# Patient Record
Sex: FEMALE | Race: WHITE | NOT HISPANIC OR LATINO | Employment: FULL TIME | URBAN - METROPOLITAN AREA
[De-identification: names, ages, dates, MRNs, and addresses within clinical notes are randomized per-mention and may not be internally consistent; named-entity substitution may affect disease eponyms.]

---

## 2017-05-01 PROCEDURE — 87086 URINE CULTURE/COLONY COUNT: CPT | Performed by: EMERGENCY MEDICINE

## 2017-05-04 ENCOUNTER — TELEPHONE (OUTPATIENT)
Dept: URGENT CARE | Facility: CLINIC | Age: 27
End: 2017-05-04

## 2017-10-27 ENCOUNTER — HOSPITAL ENCOUNTER (EMERGENCY)
Facility: HOSPITAL | Age: 27
Discharge: HOME | End: 2017-10-27
Attending: EMERGENCY MEDICINE | Admitting: EMERGENCY MEDICINE

## 2017-10-27 VITALS
WEIGHT: 293 LBS | SYSTOLIC BLOOD PRESSURE: 141 MMHG | TEMPERATURE: 97.8 F | DIASTOLIC BLOOD PRESSURE: 92 MMHG | RESPIRATION RATE: 16 BRPM | OXYGEN SATURATION: 97 % | HEART RATE: 118 BPM | BODY MASS INDEX: 41.95 KG/M2 | HEIGHT: 70 IN

## 2017-10-27 DIAGNOSIS — F41.9 ANXIETY: ICD-10-CM

## 2017-10-27 DIAGNOSIS — R00.0 TACHYCARDIA: Primary | ICD-10-CM

## 2017-10-27 PROCEDURE — 99283 EMERGENCY DEPT VISIT LOW MDM: CPT

## 2017-10-27 PROCEDURE — 93005 ELECTROCARDIOGRAM TRACING: CPT | Performed by: EMERGENCY MEDICINE

## 2017-10-27 RX ORDER — LISINOPRIL 20 MG/1
20 TABLET ORAL DAILY
COMMUNITY
End: 2022-02-23

## 2017-10-27 RX ORDER — FLUOXETINE HYDROCHLORIDE 20 MG/1
20 CAPSULE ORAL DAILY
COMMUNITY
End: 2022-02-23

## 2017-10-27 RX ORDER — METOPROLOL TARTRATE 5 MG/5ML
5 INJECTION INTRAVENOUS
Status: DISCONTINUED | OUTPATIENT
Start: 2017-10-27 | End: 2017-10-27

## 2017-10-27 RX ORDER — METHYLPHENIDATE HYDROCHLORIDE 5 MG/1
5 TABLET ORAL 2 TIMES DAILY
COMMUNITY
End: 2022-02-23

## 2017-10-27 RX ADMIN — METOPROLOL TARTRATE 25 MG: 25 TABLET ORAL at 22:09

## 2017-10-28 NOTE — ED PROVIDER NOTES
Subjective   HPI Comments: Miriam Constantino is a 27 y.o.female who presents to the ED with c/o palpitations with onset two hours ago. She reports that she was performing her usual activities when suddenly she developed rapid palpitations. She states that she also developed mild lightheadedness and chest tightness 2/2 her palpitations. She notes that she has started Prozac, a new medication for her, 2 days ago 2/2 to her hx of anxiety. She denies any diaphoresis, SOA, fever, abd pain, or any other complaints at this time. She notes that she has hx of HTN.     Patient is a 27 y.o. female presenting with palpitations.   History provided by:  Patient  Palpitations   Palpitations quality:  Fast  Onset quality:  Sudden  Timing:  Constant  Progression:  Worsening  Chronicity:  Recurrent  Relieved by:  Nothing  Worsened by:  Nothing  Ineffective treatments:  None tried  Associated symptoms: no diaphoresis and no shortness of breath        Review of Systems   Constitutional: Negative for diaphoresis and fever.   Respiratory: Positive for chest tightness. Negative for shortness of breath.    Cardiovascular: Positive for palpitations.   Gastrointestinal: Negative for abdominal pain.   Neurological: Positive for light-headedness.   All other systems reviewed and are negative.      History reviewed. No pertinent past medical history.    Allergies   Allergen Reactions   • Levaquin [Levofloxacin] Hives   • Penicillins Hives       Past Surgical History:   Procedure Laterality Date   • DENTAL PROCEDURE     • FOOT SURGERY         History reviewed. No pertinent family history.    Social History     Social History   • Marital status: Single     Spouse name: N/A   • Number of children: N/A   • Years of education: N/A     Social History Main Topics   • Smoking status: Current Every Day Smoker     Packs/day: 0.50     Types: Cigarettes   • Smokeless tobacco: None   • Alcohol use Yes      Comment: occasional   • Drug use: No   • Sexual  activity: Not Asked     Other Topics Concern   • None     Social History Narrative         Objective   Physical Exam   Constitutional: She is oriented to person, place, and time. She appears well-developed and well-nourished. No distress.   HENT:   Head: Normocephalic and atraumatic.   Nose: Nose normal.   Airway patent.   Eyes: Conjunctivae are normal. No scleral icterus.   Neck: Normal range of motion and phonation normal. Neck supple.   Cardiovascular: Regular rhythm.  Tachycardia present.    No murmur heard.  Pulmonary/Chest: Effort normal. No respiratory distress.   Abdominal: Soft. There is no tenderness.   Musculoskeletal:   No hyperreflexia. No clonus.    Neurological: She is alert and oriented to person, place, and time.   Skin: Skin is warm and dry.   Psychiatric: She has a normal mood and affect. Her behavior is normal.   Nursing note and vitals reviewed.      Procedures         ED Course  ED Course   Comment By Time   She does not wish any workup at this time.  She had workup with thyroid testing about six months ago.  She is relieved that she doesn't have an arrhythmia.  She truly believes her problem is an anxiety attack.  This is most likely.  However, since the tachycardia started soon after starting Prozac, there is a small risk that she is exhibiting early serotonin syndrome.  We discussed that possibility.  I suggested holding the Prozac a couple days, then resuming it to see if the same tachycardia returned.  She will consider that. John Galloway MD 10/27 2133     No results found for this or any previous visit (from the past 24 hour(s)).  Note: In addition to lab results from this visit, the labs listed above may include labs taken at another facility or during a different encounter within the last 24 hours. Please correlate lab times with ED admission and discharge times for further clarification of the services performed during this visit.    No orders to display     Vitals:    10/27/17 2115  10/27/17 2130 10/27/17 2145 10/27/17 2213   BP: 142/97 131/94 (!) 148/113 141/92   BP Location:       Patient Position:       Pulse: 120   118   Resp: 16   16   Temp:       TempSrc:       SpO2: 96% 94% 92% 97%   Weight:       Height:         Medications   metoprolol tartrate (LOPRESSOR) tablet 25 mg (25 mg Oral Given 10/27/17 2209)     ECG/EMG Results (last 24 hours)     Procedure Component Value Units Date/Time    ECG 12 Lead [[id number]] Collected:  10/27/17 2045     Updated:  10/27/17 2055    Narrative:       Test Reason : RAPID HR  Blood Pressure : **/** mmHG  Vent. Rate : 126 BPM     Atrial Rate : 126 BPM     P-R Int : 148 ms          QRS Dur : 078 ms      QT Int : 308 ms       P-R-T Axes : 026 000 -06 degrees     QTc Int : 446 ms    Sinus tachycardia  Septal infarct (cited on or before 30-NOV-2016)  Abnormal ECG  When compared with ECG of 30-NOV-2016 01:49,  Questionable change in initial forces of Septal leads  Confirmed by YNES GALLOWAY MD ([id number]) on 10/27/2017 8:55:09 PM    Referred By:  EDMD           Confirmed By:YNES GALLOWAY MD                        [facility]    Final diagnoses:   Anxiety   Tachycardia       Documentation assistance provided by derek Yuan.  Information recorded by the scribe was done at my direction and has been verified and validated by me.     Kwabena Parham  10/27/17 2107       Ynes Galloway MD  10/28/17 0052

## 2022-02-23 PROCEDURE — 87081 CULTURE SCREEN ONLY: CPT | Performed by: NURSE PRACTITIONER

## 2022-02-23 PROCEDURE — U0004 COV-19 TEST NON-CDC HGH THRU: HCPCS | Performed by: NURSE PRACTITIONER
